# Patient Record
Sex: MALE | Race: WHITE | URBAN - METROPOLITAN AREA
[De-identification: names, ages, dates, MRNs, and addresses within clinical notes are randomized per-mention and may not be internally consistent; named-entity substitution may affect disease eponyms.]

---

## 2024-05-05 ENCOUNTER — OFFICE VISIT (OUTPATIENT)
Dept: URGENT CARE | Facility: CLINIC | Age: 34
End: 2024-05-05
Payer: COMMERCIAL

## 2024-05-05 DIAGNOSIS — H00.031 CELLULITIS OF RIGHT UPPER EYELID: ICD-10-CM

## 2024-05-05 DIAGNOSIS — H00.013 HORDEOLUM EXTERNUM OF RIGHT EYE, UNSPECIFIED EYELID: Primary | ICD-10-CM

## 2024-05-05 PROCEDURE — 99204 OFFICE O/P NEW MOD 45 MIN: CPT | Performed by: PHYSICIAN ASSISTANT

## 2024-05-05 RX ORDER — ATORVASTATIN CALCIUM 10 MG/1
10 TABLET, FILM COATED ORAL DAILY
COMMUNITY

## 2024-05-05 RX ORDER — INSULIN ASPART 100 [IU]/ML
INJECTION, SOLUTION INTRAVENOUS; SUBCUTANEOUS
COMMUNITY

## 2024-05-05 RX ORDER — DOXYCYCLINE 100 MG/1
100 CAPSULE ORAL 2 TIMES DAILY
Qty: 14 CAPSULE | Refills: 0 | Status: SHIPPED | OUTPATIENT
Start: 2024-05-05 | End: 2024-05-12

## 2024-05-05 RX ORDER — ERYTHROMYCIN 5 MG/G
OINTMENT OPHTHALMIC 3 TIMES DAILY
Qty: 3.5 G | Refills: 0 | Status: SHIPPED | OUTPATIENT
Start: 2024-05-05 | End: 2024-05-10

## 2024-05-05 RX ORDER — INSULIN PMP CART,AUT,G6/7,CNTR
EACH SUBCUTANEOUS
COMMUNITY
Start: 2023-06-27

## 2024-05-05 ASSESSMENT — ENCOUNTER SYMPTOMS
HEMATOLOGIC/LYMPHATIC NEGATIVE: 1
ALLERGIC/IMMUNOLOGIC NEGATIVE: 1
CARDIOVASCULAR NEGATIVE: 1
EYE PAIN: 0
PSYCHIATRIC NEGATIVE: 1
CONSTITUTIONAL NEGATIVE: 1
RESPIRATORY NEGATIVE: 1
NEUROLOGICAL NEGATIVE: 1
MUSCULOSKELETAL NEGATIVE: 1
EYE DISCHARGE: 1
EYE REDNESS: 0
ENDOCRINE NEGATIVE: 1
GASTROINTESTINAL NEGATIVE: 1

## 2024-05-05 NOTE — PROGRESS NOTES
"Subjective   Patient ID: Josesito Caldwell is a 33 y.o. male.      History provided by:  Patient   used: No      This is a 33 yr old male here for right upper eyelid erythema with swelling, swollen \"bump\" right lower eyelid, and crusted eye drainage x 3 days. No active eye drainage, globe pain or vision change.     Review of Systems   Constitutional: Negative.    HENT: Negative.     Eyes:  Positive for discharge. Negative for pain, redness and visual disturbance.   Respiratory: Negative.     Cardiovascular: Negative.    Gastrointestinal: Negative.    Endocrine: Negative.    Genitourinary: Negative.    Musculoskeletal: Negative.    Skin: Negative.    Allergic/Immunologic: Negative.    Neurological: Negative.    Hematological: Negative.    Psychiatric/Behavioral: Negative.     All other systems reviewed and are negative.    Objective   Physical Exam  Vitals and nursing note reviewed.   Constitutional:       Appearance: Normal appearance.   HENT:      Head: Normocephalic and atraumatic.   Eyes:      Conjunctiva/sclera: Conjunctivae normal.      Pupils: Pupils are equal, round, and reactive to light.      Comments: right upper eyelid edema and erythema, small stye right lower eyelid   Cardiovascular:      Rate and Rhythm: Normal rate and regular rhythm.   Pulmonary:      Effort: Pulmonary effort is normal.      Breath sounds: Normal breath sounds.   Skin:     General: Skin is warm and dry.   Neurological:      General: No focal deficit present.      Mental Status: He is alert and oriented to person, place, and time.   Psychiatric:         Mood and Affect: Mood normal.         Behavior: Behavior normal.     Assessment:  cellulitis right upper eyelid  stye right eye    Plan:  no recorded vitals to review today  doxycycline 100 mg bid x 1 week  erythromycin opthalmic ointment tid  warm compresses tid  pcp follow up this week if not improving or worsening  ER visit anytime 24/7 for acute worsening or " changing condition

## 2024-05-05 NOTE — PATIENT INSTRUCTIONS
Warm compresses 2-3 times a day  pcp follow up this week if not improving or worsening  ER visit anytime 24/7 for acute worsening or changing condition

## 2025-03-12 LAB
NON-UH HIE A/G RATIO: 1.1
NON-UH HIE ALB: 3.8 G/DL (ref 3.4–5)
NON-UH HIE ALK PHOS: 112 UNIT/L (ref 45–117)
NON-UH HIE BASO COUNT: 0.04 X1000 (ref 0–0.2)
NON-UH HIE BASOS %: 0.7 %
NON-UH HIE BILIRUBIN, TOTAL: 0.9 MG/DL (ref 0.3–1.2)
NON-UH HIE BUN/CREAT RATIO: 12.2
NON-UH HIE BUN: 11 MG/DL (ref 9–23)
NON-UH HIE CALCIUM: 9.5 MG/DL (ref 8.7–10.4)
NON-UH HIE CALCULATED LDL CHOLESTEROL: 99 MG/DL (ref 60–130)
NON-UH HIE CALCULATED OSMOLALITY: 280 MOSM/KG (ref 275–295)
NON-UH HIE CHLORIDE: 105 MMOL/L (ref 98–107)
NON-UH HIE CHOLESTEROL: 161 MG/DL (ref 100–200)
NON-UH HIE CO2, VENOUS: 27 MMOL/L (ref 20–31)
NON-UH HIE CREATININE, URINE MG/DL: 178.7 MG/DL
NON-UH HIE CREATININE: 0.9 MG/DL (ref 0.6–1.1)
NON-UH HIE DIFF?: NORMAL
NON-UH HIE EOS COUNT: 0.06 X1000 (ref 0–0.5)
NON-UH HIE EOSIN %: 1 %
NON-UH HIE GFR AA: >60
NON-UH HIE GLOBULIN: 3.5 G/DL
NON-UH HIE GLOMERULAR FILTRATION RATE: >60 ML/MIN/1.73M?
NON-UH HIE GLUCOSE: 116 MG/DL (ref 74–106)
NON-UH HIE GOT: 26 UNIT/L (ref 15–37)
NON-UH HIE GPT: 29 UNIT/L (ref 10–49)
NON-UH HIE HCT: 44.8 % (ref 41–52)
NON-UH HIE HDL CHOLESTEROL: 51 MG/DL (ref 40–60)
NON-UH HIE HGB A1C: 7.6 %
NON-UH HIE HGB: 15.3 G/DL (ref 13.5–17.5)
NON-UH HIE INSTR WBC: 6.7
NON-UH HIE K: 4.3 MMOL/L (ref 3.5–5.1)
NON-UH HIE LYMPH %: 26.1 %
NON-UH HIE LYMPH COUNT: 1.74 X1000 (ref 1.2–4.8)
NON-UH HIE MCH: 30.3 PG (ref 27–34)
NON-UH HIE MCHC: 34.3 G/DL (ref 32–37)
NON-UH HIE MCV: 88.6 FL (ref 80–100)
NON-UH HIE MICROALBUMIN, URINE MG/L: <3 MG/L
NON-UH HIE MICROALBUMIN/CREATININE RATIO: <2 MG MALB/GM CREAT (ref 0–30)
NON-UH HIE MONO %: 12.9 %
NON-UH HIE MONO COUNT: 0.86 X1000 (ref 0.1–1)
NON-UH HIE MPV: 8.3 FL (ref 7.4–10.4)
NON-UH HIE NA: 140 MMOL/L (ref 135–145)
NON-UH HIE NEUTROPHIL %: 59.4 %
NON-UH HIE NEUTROPHIL COUNT (ANC): 3.96 X1000 (ref 1.4–8.8)
NON-UH HIE NUCLEATED RBC: 0 /100WBC
NON-UH HIE PLATELET: 300 X10 (ref 150–450)
NON-UH HIE RBC: 5.05 X10 (ref 4.7–6.1)
NON-UH HIE RDW: 13.1 % (ref 11.5–14.5)
NON-UH HIE TOTAL CHOL/HDL CHOL RATIO: 3.2
NON-UH HIE TOTAL PROTEIN: 7.3 G/DL (ref 5.7–8.2)
NON-UH HIE TRIGLYCERIDES: 56 MG/DL (ref 30–150)
NON-UH HIE TSH: 1.57 UIU/ML (ref 0.55–4.78)
NON-UH HIE WBC: 6.7 X10 (ref 4.5–11)